# Patient Record
Sex: MALE | Race: WHITE
[De-identification: names, ages, dates, MRNs, and addresses within clinical notes are randomized per-mention and may not be internally consistent; named-entity substitution may affect disease eponyms.]

---

## 2023-09-24 ENCOUNTER — HOSPITAL ENCOUNTER (EMERGENCY)
Age: 63
Discharge: HOME | End: 2023-09-24
Payer: COMMERCIAL

## 2023-09-24 VITALS
RESPIRATION RATE: 97 BRPM | SYSTOLIC BLOOD PRESSURE: 152 MMHG | TEMPERATURE: 97.88 F | DIASTOLIC BLOOD PRESSURE: 93 MMHG | OXYGEN SATURATION: 98 % | HEART RATE: 69 BPM

## 2023-09-24 VITALS — BODY MASS INDEX: 22.6 KG/M2

## 2023-09-24 DIAGNOSIS — U07.1: Primary | ICD-10-CM

## 2023-09-24 PROCEDURE — 99283 EMERGENCY DEPT VISIT LOW MDM: CPT

## 2023-09-24 PROCEDURE — 0202U NFCT DS 22 TRGT SARS-COV-2: CPT

## 2023-09-24 NOTE — ED_ITS
HPI - URI/Sore Throat    
General    
Chief Complaint: Upper Respiratory Infection    
Stated Complaint: TESTED POSITIVE COVID    
Time Seen by Provider: 09/24/23 19:51    
Source: patient    
Limitations: no limitations    
History of Present Illness    
HPI Narrative:     
states tested positive for COVID19 last week and was symptomatic with   
rhinorrhea, fever , cough and felt sick. Seen his PCP and prescribed decadron   
and zpak. Felt better after 3 days and continues to feel well now. Planing to   
return to work tomorrow.     
took [id number] test at home and it was positive. wants another COVID19 test here    
Related Data    
                                    Allergies    
    
    
    
Allergy/AdvReac Type Severity Reaction Status Date / Time    
     
lisinopril Allergy Unknown  Verified 09/24/23 19:32    
     
Penicillins Allergy Unknown  Verified 09/24/23 19:32    
     
Sulfa (Sulfonamide Allergy Unknown  Verified 09/24/23 19:32    
    
Antibiotics)         
    
    
    
    
Review of Systems    
    
    
ROS      
    
 Status of ROS 10 or more systems reviewed and unremarkable except as noted in   
history and below       
    
    
PFSH    
PFSH    
Social History    
Smoking status:  Never smoker     
    
    
    
Exam    
Constitutional    
Vital Signs, click to edit/add:     
    
                                Last Vital Signs    
    
    
    
Temp  97.9 F   09/24/23 19:24    
     
Pulse  69   09/24/23 19:24    
     
Resp  97 H  09/24/23 19:24    
     
BP  152/93 H  09/24/23 19:24    
     
Pulse Ox  98   09/24/23 19:24    
     
O2 Del Method  Room Air  09/24/23 19:24    
    
    
    
    
Common normals: no apparent distress, average body habitus, oriented x3, no   
limitations, healthy appearing, alert and well nourished    
Eye    
Common normals: EOMs intact bilaterally and conjunctivae normal    
Respiratory    
Common normals: normal respiratory effort, no retractions, no use of accessory   
muscles and clear to auscultation bilaterally    
GI    
Common normals: Normal to inspection, nondistended, normoactive bowel sounds   
present, soft to palpation and non-tender    
Extremity    
Common normals: normal to inspection and full ROM    
Neuro    
Common normals: oriented x3, CN's II-XII intact bilaterally, moves all   
extremities, no focal motor deficits and no sensory deficits noted    
Psych    
Appearance: grossly normal    
    
Course    
Vital Signs    
Vital signs:     
    
                                   Vital Signs    
    
    
    
Temperature  97.9 F   09/24/23 19:24    
     
Pulse Rate  69   09/24/23 19:24    
     
Respiratory Rate  97 H  09/24/23 19:24    
     
Blood Pressure  152/93 H  09/24/23 19:24    
     
Pulse Oximetry  98   09/24/23 19:24    
     
Oxygen Delivery Method  Room Air  09/24/23 19:24    
    
    
                                            
    
    
    
Temperature  97.9 F   09/24/23 19:24    
     
Pulse Rate  69   09/24/23 19:24    
     
Respiratory Rate  97 H  09/24/23 19:24    
     
Blood Pressure  152/93 H  09/24/23 19:24    
     
Pulse Oximetry  98   09/24/23 19:24    
     
Oxygen Delivery Method  Room Air  09/24/23 19:24    
    
    
    
    
    
MDM - URI/Sore Throat    
MDM Narrative    
Medical decision making narrative:     
patient tested positive and was symptomatic for COVID19 last week. He is now   
asymptomatic and concerned about returning to work tomorrow. Results here finds   
that he is still COVID19 positive. He was advised he could return to work with a  
N95 mask. States his job will not allow him to return COVID19 positive.    
Discharged home to contact his PCP for additional input or he can follow   
protocol of his job    
Lab Data    
Labs:     
    
                                   Lab Results    
    
    
    
  09/24/23 Range/Units    
    
  20:09     
     
Adenovirus (PCR)  Not detected  (NOT DETECTE)      
     
C. pneumoniae DNA (PCR)  Not detected  (NOT DETECTE)      
     
Coronavirus Type OC43  Not detected  (NOT DETECTE)      
     
Coronavirus Type HKU1  Not detected  (NOT DETECTE)      
     
Coronavirus Type 229E  Not detected  (NOT DETECTE)      
     
Coronavirus Type NL63  Not detected  (NOT DETECTE)      
     
Human Metapneumovir PCR  Not detected  (NOT DETECTE)      
     
M. pneumoniae (PCR)  Not detected  (NOT DETECTE)      
     
Parainfluenza PCR  Not detected  (NOT DETECTE)      
     
Parainfluenza 2 (PCR)  Not detected  (NOT DETECTE)      
     
Parainfluenza 3 (PCR)  Not detected  (NOT DETECTE)      
     
Parainfluenza 4 (PCR)  Not detected  (NOT DETECTE)      
     
RSV (RT-PCR)  Not detected  (NOT DETECTE)      
     
Entero/Rhino (PCR)  Not detected  (NOT DETECTE)      
     
SARS-CoV-2 (PCR)  Detected A  (NOT DETECTE)      
     
Bordetella pertussis (PCR)  Not detected  (NOT DETECTE)      
     
B parapertussis DNA PCR  Not detected  (NOT DETECTE)      
     
Influenza Type A (PCR)  Not detected  (NOT DETECTE)      
     
Influenza Type B (PCR)  Not detected  (NOT DETECTE)      
    
    
    
    
    
Discharge Plan    
Discharge    
Chief Complaint: Upper Respiratory Infection    
    
Clinical Impression:    
 COVID-19    
    
    
Patient Disposition: Home, Self-Care    
    
Instructions:  How to Recover from COVID-19 at Home (ED)    
    
Stand Alone Forms:  Portal Instructions    
    
Referrals:    
Physician,Non-Staff, MD [Primary Care Provider] - 1 week

## 2023-09-24 NOTE — ED.URI1
HPI - URI/Sore Throat
General
Chief Complaint: Upper Respiratory Infection
Stated Complaint: TESTED POSITIVE COVID
Time Seen by Provider: 09/24/23 19:51
Source: patient
Limitations: no limitations
History of Present Illness
HPI Narrative: 
states tested positive for COVID19 last week and was symptomatic with rhinorrhea, fever , cough and felt sick. Seen his PCP and prescribed decadron and zpak. Felt better after 3 days and continues to feel well now. Planing to return to work 
tomorrow. 
took [id number] test at home and it was positive. wants another COVID19 test here
Related Data
Allergies

Allergy/AdvReac Type Severity Reaction Status Date / Time
lisinopril Allergy Unknown  Verified 09/24/23 19:32
Penicillins Allergy Unknown  Verified 09/24/23 19:32
Sulfa (Sulfonamide Allergy Unknown  Verified 09/24/23 19:32
Antibiotics)     



Review of Systems
ROS  
 Status of ROS 10 or more systems reviewed and unremarkable except as noted in history and below   

PFSH
PFSH
Social History
Smoking status:  Never smoker 



Exam
Constitutional
Vital Signs, click to edit/add: 

Last Vital Signs

Temp  97.9 F   09/24/23 19:24
Pulse  69   09/24/23 19:24
Resp  97 H  09/24/23 19:24
BP  152/93 H  09/24/23 19:24
Pulse Ox  98   09/24/23 19:24
O2 Del Method  Room Air  09/24/23 19:24



Common normals: no apparent distress, average body habitus, oriented x3, no limitations, healthy appearing, alert and well nourished
Eye
Common normals: EOMs intact bilaterally and conjunctivae normal
Respiratory
Common normals: normal respiratory effort, no retractions, no use of accessory muscles and clear to auscultation bilaterally
GI
Common normals: Normal to inspection, nondistended, normoactive bowel sounds present, soft to palpation and non-tender
Extremity
Common normals: normal to inspection and full ROM
Neuro
Common normals: oriented x3, CN's II-XII intact bilaterally, moves all extremities, no focal motor deficits and no sensory deficits noted
Psych
Appearance: grossly normal

Course
Vital Signs
Vital signs: 

Vital Signs

Temperature  97.9 F   09/24/23 19:24
Pulse Rate  69   09/24/23 19:24
Respiratory Rate  97 H  09/24/23 19:24
Blood Pressure  152/93 H  09/24/23 19:24
Pulse Oximetry  98   09/24/23 19:24
Oxygen Delivery Method  Room Air  09/24/23 19:24



Temperature  97.9 F   09/24/23 19:24
Pulse Rate  69   09/24/23 19:24
Respiratory Rate  97 H  09/24/23 19:24
Blood Pressure  152/93 H  09/24/23 19:24
Pulse Oximetry  98   09/24/23 19:24
Oxygen Delivery Method  Room Air  09/24/23 19:24




MDM - URI/Sore Throat
MDM Narrative
Medical decision making narrative: 
patient tested positive and was symptomatic for COVID19 last week. He is now asymptomatic and concerned about returning to work tomorrow. Results here finds that he is still COVID19 positive. He was advised he could return to work with a N95 mask. 
States his job will not allow him to return COVID19 positive.  Discharged home to contact his PCP for additional input or he can follow protocol of his job
Lab Data
Labs: 

Lab Results

  09/24/23 Range/Units
  20:09 
Adenovirus (PCR)  Not detected  (NOT DETECTE)  
C. pneumoniae DNA (PCR)  Not detected  (NOT DETECTE)  
Coronavirus Type OC43  Not detected  (NOT DETECTE)  
Coronavirus Type HKU1  Not detected  (NOT DETECTE)  
Coronavirus Type 229E  Not detected  (NOT DETECTE)  
Coronavirus Type NL63  Not detected  (NOT DETECTE)  
Human Metapneumovir PCR  Not detected  (NOT DETECTE)  
M. pneumoniae (PCR)  Not detected  (NOT DETECTE)  
Parainfluenza PCR  Not detected  (NOT DETECTE)  
Parainfluenza 2 (PCR)  Not detected  (NOT DETECTE)  
Parainfluenza 3 (PCR)  Not detected  (NOT DETECTE)  
Parainfluenza 4 (PCR)  Not detected  (NOT DETECTE)  
RSV (RT-PCR)  Not detected  (NOT DETECTE)  
Entero/Rhino (PCR)  Not detected  (NOT DETECTE)  
SARS-CoV-2 (PCR)  Detected A  (NOT DETECTE)  
Bordetella pertussis (PCR)  Not detected  (NOT DETECTE)  
B parapertussis DNA PCR  Not detected  (NOT DETECTE)  
Influenza Type A (PCR)  Not detected  (NOT DETECTE)  
Influenza Type B (PCR)  Not detected  (NOT DETECTE)  




Discharge Plan
Discharge
Chief Complaint: Upper Respiratory Infection

Clinical Impression:
 COVID-19


Patient Disposition: Home, Self-Care

Instructions:  How to Recover from COVID-19 at Home (ED)

Stand Alone Forms:  Portal Instructions

Referrals:
Physician,Non-Staff, MD [Primary Care Provider] - 1 week